# Patient Record
Sex: MALE | Race: WHITE | NOT HISPANIC OR LATINO | URBAN - METROPOLITAN AREA
[De-identification: names, ages, dates, MRNs, and addresses within clinical notes are randomized per-mention and may not be internally consistent; named-entity substitution may affect disease eponyms.]

---

## 2019-12-11 ENCOUNTER — HOSPITAL ENCOUNTER (EMERGENCY)
Facility: HOSPITAL | Age: 34
Discharge: HOME | End: 2019-12-11
Attending: EMERGENCY MEDICINE
Payer: COMMERCIAL

## 2019-12-11 ENCOUNTER — APPOINTMENT (EMERGENCY)
Dept: RADIOLOGY | Facility: HOSPITAL | Age: 34
End: 2019-12-11
Attending: EMERGENCY MEDICINE
Payer: COMMERCIAL

## 2019-12-11 VITALS
OXYGEN SATURATION: 99 % | WEIGHT: 190 LBS | BODY MASS INDEX: 27.2 KG/M2 | HEIGHT: 70 IN | TEMPERATURE: 97.6 F | HEART RATE: 72 BPM | RESPIRATION RATE: 15 BRPM | DIASTOLIC BLOOD PRESSURE: 68 MMHG | SYSTOLIC BLOOD PRESSURE: 135 MMHG

## 2019-12-11 DIAGNOSIS — S61.209A AVULSION OF FINGER TIP, INITIAL ENCOUNTER: Primary | ICD-10-CM

## 2019-12-11 PROCEDURE — 0HQGXZZ REPAIR LEFT HAND SKIN, EXTERNAL APPROACH: ICD-10-PCS | Performed by: PHYSICIAN ASSISTANT

## 2019-12-11 PROCEDURE — 99283 EMERGENCY DEPT VISIT LOW MDM: CPT | Mod: 25

## 2019-12-11 PROCEDURE — 12001 RPR S/N/AX/GEN/TRNK 2.5CM/<: CPT

## 2019-12-11 PROCEDURE — 73130 X-RAY EXAM OF HAND: CPT | Mod: LT

## 2019-12-11 PROCEDURE — 63700000 HC SELF-ADMINISTRABLE DRUG: Performed by: PHYSICIAN ASSISTANT

## 2019-12-11 PROCEDURE — 63600000 HC DRUGS/DETAIL CODE: Performed by: EMERGENCY MEDICINE

## 2019-12-11 PROCEDURE — 90715 TDAP VACCINE 7 YRS/> IM: CPT | Performed by: EMERGENCY MEDICINE

## 2019-12-11 PROCEDURE — 90471 IMMUNIZATION ADMIN: CPT | Performed by: EMERGENCY MEDICINE

## 2019-12-11 RX ORDER — CEPHALEXIN 500 MG/1
500 CAPSULE ORAL 4 TIMES DAILY
Qty: 28 CAPSULE | Refills: 0 | Status: SHIPPED | OUTPATIENT
Start: 2019-12-11 | End: 2019-12-18

## 2019-12-11 RX ORDER — CEPHALEXIN 500 MG/1
500 CAPSULE ORAL ONCE
Status: COMPLETED | OUTPATIENT
Start: 2019-12-11 | End: 2019-12-11

## 2019-12-11 RX ADMIN — CEPHALEXIN 500 MG: 500 CAPSULE ORAL at 23:51

## 2019-12-11 RX ADMIN — TETANUS TOXOID, REDUCED DIPHTHERIA TOXOID AND ACELLULAR PERTUSSIS VACCINE, ADSORBED 0.5 ML: 5; 2.5; 8; 8; 2.5 SUSPENSION INTRAMUSCULAR at 21:31

## 2019-12-12 NOTE — CONSULTS
"    Orthopaedic Surgery Consult      HPI     CC: fingertip amputation     HPI: Patient is a 34 y.o. year old RHD male with no PMH p/w left index finger injury. Patient was using a  while doing work around his house. About 20 min prior to arrival, patient states that the lock on the  disengaged, and the blade closed on his left index finger, completely amputating the radial edge of his fingertip. Patient immediately presented to the [facility] ED, without the amputated fingertip. The blade and region of the house were not contaminated. Unknown tetanus status. Tetanus booster administered in ED upon arrival. Prior to ortho team seeing patient, ED team administered a digital block; therefore, a complete neurologic exam could not be performed. Patient describes a \"swollen\" & throbbing type of pain at the tip of his left index finger. Patient's neighbor ultimately brought the fingertip to [facility] ED.       Past Medical History:   Diagnosis Date   • No known problems      History reviewed. No pertinent surgical history.  Not in a hospital admission.  No Known Allergies  ROS: See HPI. All else negative.     Objective   Vitals:    12/11/19 2100   BP: 135/68   Pulse: 72   Resp: 15   Temp: 36.4 °C (97.6 °F)   SpO2: 99%       General: No acute distress   Cardiovascular: Regular rate and rhythm   Pulmonary: Lungs clear bilaterally   Musculoskeletal:  Right Upper Extremity:   Inspection: atraumatic; skin intact; no gross deformity   Palpation: no tenderness to palpation  Neurovascular: SILT radial/median/ulnar; palpable radial pulse   Motor: +finger flexion/wrist extension/hand intrinsics; AIN/PIN motor intact   ROM: full painless ROM shoulder/elbow/forearm/wrist/fingers     Left Upper Extremity:   Inspection: complete amputation of radial ~1/4 of the fingertip of the index finger, exposed soft tissue, tip of distal phalanx visualized, no gross contamination, no pulsatile bleeding, clean/sharp skin edges; superficial " laceration of long finger over dorsal distal proximal phalanx, no active bleeding  Palpation: tenderness at amputation site   Neurovascular: sensation grossly intact median/radial/ulnar, grossly intact distally to amputation site; palpable radial pulse; fingertips warm, well perfused; good capillary refill   Motor: +finger flexion/wrist extensors/hand intrinsics; +FDS/FDP to index finger  ROM: full painless ROM shoulder/elbow/forearm/wrist/fingers; pain with DIP motion of index finger    Right Lower Extremity:  Inspection: atraumatic; skin intact; no gross deformity    Palpation: no tenderness to palpation   Neurovascular: sensation intact SPN/DPN/sural/saphenous/tibial; palpable DP/PT  Motor: +EHL/DF/PF  ROM: full painless ROM hip/knee/ankle/foot/toes    Left Lower Extremity:  Inspection: atraumatic; skin intact; no gross deformity    Palpation: no tenderness to palpation   Neurovascular: sensation intact SPN/DPN/sural/saphenous/tibial; palpable DP/PT  Motor: +EHL/DF/PF  ROM: full painless ROM hip/knee/ankle/foot/toes    Imaging:  I have reviewed all imaging. Radiographs reveal lack of soft tissue over tip of distal phalanx of left index finger.                                   Assessment/Plan     34 y.o. year old male with complete amputation of left index fingertip . Stable.   -Fingertip repair per ortho  -Pain control  -Maintain dressing dry and in place until f/u with hand surgeon  -DC on Keflex   -No ice   -Okay for light ROM   -F/u with hand surgeon in 1-2 days for wound check   -Return to ED if increasing pain, drainage, fever, or if concerned for worsening of symptoms.   -Patient expressed full understanding and agreement with the plan. All questions answered.     Procedure Note   Left index finger irrigated with copious amounts of sterile saline then allowed to soak in sterile saline with betadine. Amputated fingertip also soaked in sterile saline with betadine. After cleaning skin with chlorhexadine, 1%  lidocaine without epinephrine was injected into the left index finger at the level of the MCP joint palmarly and dorsally to achieve an adequate digital block. The amputated fingertip was then reapproximated to the index finger with 5-0 chromic gut. Triple antibiotic ointment, adaptic, and soft dressing was then applied. Patient tolerated this well.     Edvin Andrade MD   Orthopaedic Surgery PGY4

## 2019-12-12 NOTE — ED PROVIDER NOTES
"HPI     Chief Complaint   Patient presents with   • Upper Extremity Issue     L pointer finger tip amputation       34-year-old male concerned with a chief complaint of left finger amputation x1 day.  Patient was using sharp bleed prior to arrival and cut the tip including the nail of his finger on the left index.  Patient is unsure when his previous tetanus shot was.  Patient denies any other injury but appears to have a laceration to the third knuckle.  Patient does not take any blood thinners.           Patient History     Past Medical History:   Diagnosis Date   • No known problems        History reviewed. No pertinent surgical history.    History reviewed. No pertinent family history.    Social History     Tobacco Use   • Smoking status: Never Smoker   • Smokeless tobacco: Never Used   Substance Use Topics   • Alcohol use: Yes     Comment: occasionally   • Drug use: Not Currently       Systems Reviewed from Nursing Triage:          Review of Systems     Review of Systems   All other systems reviewed and are negative.       Physical Exam     ED Triage Vitals [12/11/19 2100]   Temp Heart Rate Resp BP SpO2   36.4 °C (97.6 °F) 72 15 135/68 99 %      Temp Source Heart Rate Source Patient Position BP Location FiO2 (%) (Set)   Oral -- -- -- --                     Patient Vitals for the past 24 hrs:   BP Temp Temp src Pulse Resp SpO2 Height Weight   12/11/19 2100 135/68 36.4 °C (97.6 °F) Oral 72 15 99 % 1.778 m (5' 10\") 86.2 kg (190 lb)                                       Physical Exam   Constitutional: He appears well-developed and well-nourished.   HENT:   Head: Normocephalic and atraumatic.   Eyes: Conjunctivae are normal.   Neck: Neck supple.   Cardiovascular: Normal rate and regular rhythm.   No murmur heard.  Pulmonary/Chest: Effort normal and breath sounds normal. No respiratory distress.   Abdominal: Soft. There is no tenderness.   Musculoskeletal: He exhibits no edema.        Right hand: He exhibits " laceration.        Hands:  Neurological: He is alert.   Skin: Skin is warm and dry.   Psychiatric: He has a normal mood and affect.   Nursing note and vitals reviewed.           Procedures    ED Course & [facility]     Labs Reviewed - No data to display    X-RAY HAND LEFT 3+ VIEWS    (Results Pending)               [facility]         ED Course as of Dec 12 0044   Wed Dec 11, 2019   2252 After performing digital block, ortho was in the room, pt friend of hand surgery who contacted ortho to see patient, they assumed care of patient in ED at that tme    [EA]   u Dec 12, 2019   0042 Ortho repaired the digit was to the avulsed tip that the neighbor brought in.  Informed the patient that the tip will fall off that it will act as a biological Band-Aid.  Sent home with p.o. Keflex and hand follow-up.  Patient's neighbor is a hand surgeon at [facility] hand specialist will give Dr. Begum just in case at [facility].    [EA]      ED Course User Index  [EA] Lashell Nelson PA C         Clinical Impressions as of Dec 12 0044   Avulsion of finger tip, initial encounter        Lashell Nelson PA C  12/12/19 0044

## 2019-12-12 NOTE — DISCHARGE INSTRUCTIONS
Keep area dry, return to ED for signs of infection such as redness, swelling, severe pain or fever  Return to the ED for worsening of symptoms or any problems or concerns.  It is very important to follow up with your healthcare provider for re-evaluation.

## 2019-12-12 NOTE — ED ATTESTATION NOTE
I have personally seen and examined the patient.  I reviewed and agree with physician assistant / nurse practitioner’s assessment and plan of care, with the following exceptions: None  My examination, assessment, and plan of care of Uriel Mcmahan is as follows:     Patient is a 34-year-old male who presents the emergency department for evaluation of a fingertip amputation.  Just prior to arrival he was using a blade when he cut his finger.  He is not sure when his last tetanus shot was.    On exam, patient is awake, alert.  He has an avulsion to the tip of the second finger.  No obvious bone is exposed, but the nail is involved.  3 is all distal to the PIP.  He also has a laceration over the knuckle of the third finger.    Will obtain x-ray, update tetanus and evaluate.      Kelly Clement MD  12/11/19 3168

## 2020-09-18 ENCOUNTER — HOSPITAL ENCOUNTER (OUTPATIENT)
Facility: CLINIC | Age: 35
Discharge: STILL ADMITTED OR ADMINISTRATIVE | End: 2020-09-18
Payer: COMMERCIAL

## 2021-04-13 DIAGNOSIS — Z23 ENCOUNTER FOR IMMUNIZATION: ICD-10-CM
